# Patient Record
(demographics unavailable — no encounter records)

---

## 2025-06-23 NOTE — IMMUNIZATIONS
[Immunizations are up to date] : Immunizations are up to date [Records maintained by [facility]] : Records maintained by [facility]

## 2025-07-02 NOTE — PHYSICAL EXAM
[PERRLA] : KAILEY [S1, S2 Present] : S1, S2 present [Cardiac Auscultation] : normal cardiac auscultation  [Respiratory Effort] : normal respiratory effort [Clear to auscultation] : clear to auscultation [Soft] : soft [NonTender] : non tender [Non Distended] : non distended [Normal Bowel Sounds] : normal bowel sounds [No Hepatosplenomegaly] : no hepatosplenomegaly [No Abnormal Lymph Nodes Palpated] : no abnormal lymph nodes palpated [Range Of Motion] : full range of motion [Intact Judgement] : intact judgement  [Insight Insight] : intact insight [Acute distress] : no acute distress [Rash] : no rash [Erythematous Conjunctiva] : nonerythematous conjunctiva [Erythematous Oropharynx] : nonerythematous oropharynx [Lesions] : no lesions [Murmurs] : no murmurs [Peripheral Edema] : no peripheral edema  [Joint effusions] : no joint effusions [1] : 1 [de-identified] : R distal forearm swelling, tenderness over R wrist

## 2025-07-02 NOTE — CONSULT LETTER
[Dear  ___] : Dear  [unfilled], [Consult Letter:] : I had the pleasure of evaluating your patient, [unfilled]. [( Thank you for referring [unfilled] for consultation for _____ )] : Thank you for referring [unfilled] for consultation for [unfilled] [Please see my note below.] : Please see my note below. [Consult Closing:] : Thank you very much for allowing me to participate in the care of this patient.  If you have any questions, please do not hesitate to contact me. [Sincerely,] : Sincerely, [FreeTextEntry2] : Patti Robertson MD  [address] [place] [address]  [FreeTextEntry3] : Carmen Pat MD Pediatric Rheumatology Fellow [facility]

## 2025-07-02 NOTE — DISCUSSION/SUMMARY
[FreeTextEntry1] : Questions and concerns addressed. Parent verbalized understanding and agreed with plan above.

## 2025-07-02 NOTE — REVIEW OF SYSTEMS
[NI] : Endocrine [Nl] : Hematologic/Lymphatic [Joint Swelling] : joint swelling  [Appropriate Age Development] : development appropriate for age [Joint Pains] : arthralgias [Fever] : no fever [Rash] : no rash [Eye Pain] : no eye pain [Redness] : no redness [Blurry Vision] : no blurred vision [Oral Ulcers] : no oral ulcers [Chest Pain] : no chest pain or discomfort [Cough] : no cough [Shortness of Breath] : no shortness of breath [Change in Appetite] : no change in appetite [Vomiting] : no vomiting [Diarrhea] : no diarrhea [Decrease In Appetite] : no decrease in appetite [Abdominal Pain] : no abdominal pain [Constipation] : no constipation [Limping] : no limping [Back Pain] : ~T no back pain [Muscle Aches] : no muscle aches [AM Stiffness] : no am stiffness [Smokers in Home] : no one in home smokes

## 2025-07-02 NOTE — END OF VISIT
[] : Fellow [FreeTextEntry3] : Physical exmaination findings of hand and distal forearm swelling likely secondary to injury and less suspicious for underlying autoimmunity. Consider MRI. Obtain XR result from PCP.  Plan otherwise well outlined per Dr Pat above.  I discussed this patient in a pre-clinic session with the fellow including clinical status and last set of laboratory testing results. I also saw the patient and discussed history, completed an exam and discussed the plan together with the fellow.  Total time spent today included reviewing prior notes, results, and time with patient/parent. Time spent teaching and/or on separate procedures was not counted toward this total time. Time spent -    60  minutes

## 2025-07-02 NOTE — SOCIAL HISTORY
[Parent(s)] : parent(s) [Grade:  _____] : Grade: [unfilled] [___ Brothers] : [unfilled] brothers [FreeTextEntry1] : completed , starting 1st grade in the fall

## 2025-07-02 NOTE — HISTORY OF PRESENT ILLNESS
[Unlimited ADLs] : able to do activities of daily living without limitations [Unlimited Sports] : able to participate in sports without limitations [Rheumatoid Arthritis] : Rheumatoid Arthritis [FreeTextEntry1] : 5 y/o presents with intermittent R hand swelling and pain for 2 months s/p trauma, more persistent for 1 month. Saw ortho and x-ray was done and normal as per mother. Mother gives Motrin PRN, with relief. No other joint pain or joint swelling.   No fever, rash, or recent illness.  No eye pain/redness/change in vision.  No sores in the mouth.  No difficulty swallowing.  No chest pain or shortness of breath.  No abdominal complaints. Appetite slightly decreases, no weight changes.  No headaches.  No urinary changes.  No other new symptoms.   Ortho () did cbc and CMP wnl, ESR 2 , CRP 4.2  lyme negative, ASO normal  [Juvenile Rheumatoid Arthritis] : no Juvenile Rheumatoid Arthritis [Ankylosing Spondylitis] : no Ankylosing Spondylitis [Psoriasis] : no Psoriasis [Diabetes Mellitus (type 1 - insulin dependent)] : no Type 1 Diabetes Mellitus [Systemic Lupus Erythematosus] : no Systemic Lupus Erythematosus [Raynaud's Disease] : no Raynaud's Disease [IBD - Crohns] : no Crohn's Inflammatory Bowel disease [IBD - Ulcerative Colitis] : no Ulcerative Colitis Inflammatory Bowel Disease [Graves' Disease] : no Graves' Disease [Hashimoto's Thyroiditis] : no Hashimoto's Thyroiditis [Multiple Sclerosis] : no Multiple Sclerosis [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

## 2025-07-02 NOTE — HISTORY OF PRESENT ILLNESS
[Unlimited ADLs] : able to do activities of daily living without limitations [Unlimited Sports] : able to participate in sports without limitations [Rheumatoid Arthritis] : Rheumatoid Arthritis [FreeTextEntry1] : 5 y/o presents with intermittent R hand swelling and pain for 2 months s/p trauma, more persistent for 1 month. Saw ortho and x-ray was done and normal as per mother. Mother gives Motrin PRN, with relief. No other joint pain or joint swelling.   No fever, rash, or recent illness.  No eye pain/redness/change in vision.  No sores in the mouth.  No difficulty swallowing.  No chest pain or shortness of breath.  No abdominal complaints. Appetite slightly decreases, no weight changes.  No headaches.  No urinary changes.  No other new symptoms.   Ortho () did cbc and CMP wnl, ESR 2 , CRP 4.2  lyme negative, ASO normal  [Juvenile Rheumatoid Arthritis] : no Juvenile Rheumatoid Arthritis [Ankylosing Spondylitis] : no Ankylosing Spondylitis [Psoriasis] : no Psoriasis [Diabetes Mellitus (type 1 - insulin dependent)] : no Type 1 Diabetes Mellitus [Systemic Lupus Erythematosus] : no Systemic Lupus Erythematosus [Raynaud's Disease] : no Raynaud's Disease [IBD - Crohns] : no Crohn's Inflammatory Bowel disease [IBD - Ulcerative Colitis] : no Ulcerative Colitis Inflammatory Bowel Disease [Graves' Disease] : no Graves' Disease [Hashimoto's Thyroiditis] : no Hashimoto's Thyroiditis [Multiple Sclerosis] : no Multiple Sclerosis [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

## 2025-07-02 NOTE — PHYSICAL EXAM
[PERRLA] : KAILEY [S1, S2 Present] : S1, S2 present [Cardiac Auscultation] : normal cardiac auscultation  [Respiratory Effort] : normal respiratory effort [Clear to auscultation] : clear to auscultation [Soft] : soft [NonTender] : non tender [Non Distended] : non distended [Normal Bowel Sounds] : normal bowel sounds [No Hepatosplenomegaly] : no hepatosplenomegaly [No Abnormal Lymph Nodes Palpated] : no abnormal lymph nodes palpated [Range Of Motion] : full range of motion [Intact Judgement] : intact judgement  [Insight Insight] : intact insight [Acute distress] : no acute distress [Rash] : no rash [Erythematous Conjunctiva] : nonerythematous conjunctiva [Erythematous Oropharynx] : nonerythematous oropharynx [Lesions] : no lesions [Murmurs] : no murmurs [Peripheral Edema] : no peripheral edema  [Joint effusions] : no joint effusions [1] : 1 [de-identified] : R distal forearm swelling, tenderness over R wrist

## 2025-07-02 NOTE — CONSULT LETTER
[Dear  ___] : Dear  [unfilled], [Consult Letter:] : I had the pleasure of evaluating your patient, [unfilled]. [( Thank you for referring [unfilled] for consultation for _____ )] : Thank you for referring [unfilled] for consultation for [unfilled] [Please see my note below.] : Please see my note below. [Consult Closing:] : Thank you very much for allowing me to participate in the care of this patient.  If you have any questions, please do not hesitate to contact me. [Sincerely,] : Sincerely, [FreeTextEntry2] : Patti Robertson MD  [address] [place] [address]  [FreeTextEntry3] : Carmen Pat MD Pediatric Rheumatology Fellow [facility]